# Patient Record
Sex: FEMALE | Race: WHITE | NOT HISPANIC OR LATINO | Employment: OTHER | URBAN - METROPOLITAN AREA
[De-identification: names, ages, dates, MRNs, and addresses within clinical notes are randomized per-mention and may not be internally consistent; named-entity substitution may affect disease eponyms.]

---

## 2018-03-14 ENCOUNTER — TELEPHONE (OUTPATIENT)
Dept: ORTHOPEDIC SURGERY | Facility: CLINIC | Age: 83
End: 2018-03-14

## 2018-03-19 ENCOUNTER — LAB (OUTPATIENT)
Dept: LAB | Facility: HOSPITAL | Age: 83
End: 2018-03-19

## 2018-03-19 ENCOUNTER — OFFICE VISIT (OUTPATIENT)
Dept: ORTHOPEDIC SURGERY | Facility: CLINIC | Age: 83
End: 2018-03-19

## 2018-03-19 VITALS — WEIGHT: 145 LBS | BODY MASS INDEX: 24.75 KG/M2 | HEIGHT: 64 IN | TEMPERATURE: 98.3 F

## 2018-03-19 DIAGNOSIS — S82.64XA CLOSED NONDISPLACED FRACTURE OF LATERAL MALLEOLUS OF RIGHT FIBULA, INITIAL ENCOUNTER: ICD-10-CM

## 2018-03-19 DIAGNOSIS — E55.9 VITAMIN D DEFICIENCY: ICD-10-CM

## 2018-03-19 DIAGNOSIS — S99.911A ANKLE INJURY, RIGHT, INITIAL ENCOUNTER: Primary | ICD-10-CM

## 2018-03-19 LAB — 25(OH)D3 SERPL-MCNC: 33.6 NG/ML (ref 30–100)

## 2018-03-19 PROCEDURE — 73610 X-RAY EXAM OF ANKLE: CPT | Performed by: ORTHOPAEDIC SURGERY

## 2018-03-19 PROCEDURE — 82306 VITAMIN D 25 HYDROXY: CPT

## 2018-03-19 PROCEDURE — 36415 COLL VENOUS BLD VENIPUNCTURE: CPT

## 2018-03-19 PROCEDURE — 99204 OFFICE O/P NEW MOD 45 MIN: CPT | Performed by: ORTHOPAEDIC SURGERY

## 2018-03-19 RX ORDER — INSULIN GLARGINE 100 [IU]/ML
INJECTION, SOLUTION SUBCUTANEOUS DAILY
COMMUNITY

## 2018-03-19 RX ORDER — ENTACAPONE 200 MG/1
200 TABLET ORAL 3 TIMES DAILY
COMMUNITY

## 2018-03-19 RX ORDER — ERGOCALCIFEROL (VITAMIN D2) 10 MCG
400 TABLET ORAL DAILY
COMMUNITY

## 2018-03-19 NOTE — PROGRESS NOTES
"   New Patient Complaint      Patient: Nickie Leung  YOB: 1935 82 y.o. female  Medical Record Number: [id number]    Chief Complaints: My ankle was hurting    History of Present Illness:     Patient had relatively insidious onset of ankle pain between February 10 and March 1.  She was seen at location out of town and diagnosed with a stress fracture and was placed into a cast on 3/1/18.  She's had some improvement in pain and now describes mild intermittent aching pain with swelling when she does not elevated and worse with standing and improved some with use of Tylenol.     HPI    Allergies:   Allergies   Allergen Reactions   • Epinephrine Arrhythmia     Heart speeds up   • Penicillins Rash       Medications:   No current outpatient prescriptions on file prior to visit.     No current facility-administered medications on file prior to visit.        Past Medical History:   Diagnosis Date   • Diabetes    • Parkinsons      History reviewed. No pertinent surgical history.  Social History     Occupational History   • Not on file.     Social History Main Topics   • Smoking status: Never Smoker   • Smokeless tobacco: Never Used   • Alcohol use Not on file   • Drug use: Unknown   • Sexual activity: Not on file      Social History     Social History Narrative   • No narrative on file     Family History   Problem Relation Age of Onset   • Hypertension Mother    • Diabetes Mother    • Heart disease Father        Review of Systems: 14 point review of systems performed, positive pertinent findings identified in HPI. All remaining systems negativeExcept dry eyes     Review of Systems      Physical Exam:   Vitals:    03/19/18 1032   Temp: 98.3 °F (36.8 °C)   Weight: 65.8 kg (145 lb)   Height: 162.6 cm (64\")     Physical Exam   Constitutional: pleasant, well developed  she is with her daughters  Eyes: sclera non icteric  Hearing : adequate for exam  Cardiovascular: palpable pulses in right foot, right calf/ thigh NT " without sign of DVT  Respiratoy: breathing unlabored   Neurological: grossly sensate to LT throughout right LE  Psychiatric: oriented with normal mood and affect.   Lymphatic: No palpable popliteal lymphadenopathy right LE  Skin: intact throughout right leg/foot  Musculoskeletal: Right ankle shows minimal swelling.  There is palpable callus formation over the lateral aspect of the fibula 5-6 cm proximal to the tip.  There is minimal discomfort palpation.  She had no pain at the ankle with external rotation testing and was nontender medially.  Physical Exam  Ortho Exam    Radiology: 3 views of the right ankle ordered to evaluate pain reviewed and no prior x-rays available for comparison there is a transverse fracture of the fibular shaft approximately 5-6 cm above the tip of the fibula.  There is minimal translation there does appear to be some early callus formation.  I did not see any malalignment of the talus within the mortise    Assessment/Plan: Right fibular stress fracture     I reviewed with him that given her improvement in pain as well as the alignment of it that I would not recommend surgical treatment at this time.    The cast has been bothering her quite a bit causing some superficial abrasions on the left leg.  We'll have her fitted with a boot today for use whenever she is up and about and may do some partial weightbearing with the boot with a walker.  She's keep this elevated as much as possible and he will sleep in an air stirrup brace.    He understand that should it displace or fail to heal and may require surgical treatment.    She has a history of vitamin D deficiency but only takes 400 units daily at this time.    We need to check a new level on her and may need to augment this especially while she is healing the fracture.    I will see her back in 4 weeks' x-rays of her right ankle    In the interim she may continue doing some home physical therapy which she is already doing with the therapist  for calf and quad strengthening to help maintain muscle tone.

## 2018-04-05 ENCOUNTER — TELEPHONE (OUTPATIENT)
Dept: ORTHOPEDIC SURGERY | Facility: CLINIC | Age: 83
End: 2018-04-05

## 2018-04-05 DIAGNOSIS — E55.9 VITAMIN D DEFICIENCY: Primary | ICD-10-CM

## 2018-04-05 NOTE — TELEPHONE ENCOUNTER
Call returned tot he patient's daughter.  Vitamin D level was 33.6.  Daughter reports that her mother is taking Vitamin D3 1000 units daily. Have advised her that he likes to keep Vitamin D level between 50-60.  May need to increase her dose.  Will have her continue with the same dose for now.  Will discuss with [id number] when he returns to the office.  Daughter understands and agrees. AMB

## 2018-04-06 ENCOUNTER — TELEPHONE (OUTPATIENT)
Dept: ORTHOPEDIC SURGERY | Facility: CLINIC | Age: 83
End: 2018-04-06

## 2018-04-09 NOTE — TELEPHONE ENCOUNTER
Can you please order this, please see previous office note for details of home PT and wt bearing status, thanks

## 2018-04-09 NOTE — TELEPHONE ENCOUNTER
Have left a message for patient's daughter to have her mother increase Vitamin D3 to 3000 units daily and will recheck her Vitamin D level in 4 weeks.

## 2018-04-09 NOTE — TELEPHONE ENCOUNTER
Have left a message with [facility] that it is okay to continue seeing the patient for home PT until her follow-up with Dr. Marsh on April 16 per MW M

## 2018-04-16 ENCOUNTER — OFFICE VISIT (OUTPATIENT)
Dept: ORTHOPEDIC SURGERY | Facility: CLINIC | Age: 83
End: 2018-04-16

## 2018-04-16 VITALS — TEMPERATURE: 98.1 F | BODY MASS INDEX: 24.75 KG/M2 | WEIGHT: 145 LBS | HEIGHT: 64 IN

## 2018-04-16 DIAGNOSIS — E55.9 VITAMIN D DEFICIENCY: ICD-10-CM

## 2018-04-16 DIAGNOSIS — G89.29 CHRONIC PAIN OF RIGHT ANKLE: Primary | ICD-10-CM

## 2018-04-16 DIAGNOSIS — S82.64XD CLOSED NONDISPLACED FRACTURE OF LATERAL MALLEOLUS OF RIGHT FIBULA WITH ROUTINE HEALING, SUBSEQUENT ENCOUNTER: ICD-10-CM

## 2018-04-16 DIAGNOSIS — M25.571 CHRONIC PAIN OF RIGHT ANKLE: Primary | ICD-10-CM

## 2018-04-16 PROCEDURE — 99212 OFFICE O/P EST SF 10 MIN: CPT | Performed by: ORTHOPAEDIC SURGERY

## 2018-04-16 PROCEDURE — 73610 X-RAY EXAM OF ANKLE: CPT | Performed by: ORTHOPAEDIC SURGERY

## 2018-04-16 NOTE — PROGRESS NOTES
"Ankle Follow Up      Patient: Nickie Leung    YOB: 1935 82 y.o. female    Chief Complaints: Ankle \"much better\"    History of Present Illness: Is up right ankle stress fractures that began between February 10 and March 1.  I initially saw her on 3/19/18.  Since then she's been partial weightbearing with boot and walker states the pain is much better really not having any significant pain to the right ankle.  She's been doing home physical and occupational therapy which she and her daughter feel it helped dramatically  HPI    ROS: No ankle pain  Past Medical History:   Diagnosis Date   • Diabetes    • Parkinsons        Physical Exam:   Vitals:    04/16/18 0911   Temp: 98.1 °F (36.7 °C)   TempSrc: Temporal Artery    Weight: 65.8 kg (145 lb)   Height: 162.6 cm (64\")     Well developed with normal mood.She is with her daughter.  There is palpable callus around the fracture site minimal swelling and no tenderness to palpation there is no pain with external rotation testing and nontender medially      Radiology: Views right ankle ordered to evaluate alignment reviewed and compared with previous x-rays.  His been no change in alignment to the fracture or to the ankle joint.  There is increased callus    Vitamin D 3/19/18 33.6      Assessment/Plan: 1.  Nonoperative treatment right distal fibular stress fracture  2.  Vitamin D at low end of normal    Regarding the ankle and encouraged that she is no longer having pain.  She may transition to full weightbearing with her boot and walker for the next 2 weeks and if still without pain may then transition to a cane.  Physical therapy orders were given to the daughter to continue with home therapy.    Regarding vitamin D, she is at the very low end of normal and was previous he taking 400 units daily.  Since 4/9/18 she has been increased to 3000 units daily and he understand to have her level rechecked in about 3 weeks.    I will see her back in 4 weeks' x-rays of " her right ankle.  They understand that if any point she has increase in pain she'll get off of this and let me know

## 2018-04-19 ENCOUNTER — HOSPITAL ENCOUNTER (OUTPATIENT)
Dept: CARDIOLOGY | Facility: HOSPITAL | Age: 83
Discharge: HOME | End: 2018-04-19
Attending: ORTHOPAEDIC SURGERY | Admitting: ORTHOPAEDIC SURGERY

## 2018-04-19 ENCOUNTER — HOSPITAL ENCOUNTER (OUTPATIENT)
Dept: MRI IMAGING | Facility: HOSPITAL | Age: 83
Discharge: HOME | End: 2018-04-19
Attending: ORTHOPAEDIC SURGERY

## 2018-04-19 ENCOUNTER — TELEPHONE (OUTPATIENT)
Dept: ORTHOPEDIC SURGERY | Facility: CLINIC | Age: 83
End: 2018-04-19

## 2018-04-19 ENCOUNTER — OFFICE VISIT (OUTPATIENT)
Dept: ORTHOPEDIC SURGERY | Facility: CLINIC | Age: 83
End: 2018-04-19

## 2018-04-19 VITALS — BODY MASS INDEX: 24.75 KG/M2 | WEIGHT: 145 LBS | HEIGHT: 64 IN | TEMPERATURE: 97.5 F

## 2018-04-19 DIAGNOSIS — S82.64XD CLOSED NONDISPLACED FRACTURE OF LATERAL MALLEOLUS OF RIGHT FIBULA WITH ROUTINE HEALING, SUBSEQUENT ENCOUNTER: ICD-10-CM

## 2018-04-19 DIAGNOSIS — M79.661 PAIN OF RIGHT CALF: Primary | ICD-10-CM

## 2018-04-19 DIAGNOSIS — Z87.81 HISTORY OF FRACTURE OF RIGHT ANKLE: ICD-10-CM

## 2018-04-19 DIAGNOSIS — M79.604 LEG PAIN, DIFFUSE, RIGHT: ICD-10-CM

## 2018-04-19 DIAGNOSIS — M79.661 PAIN OF RIGHT CALF: ICD-10-CM

## 2018-04-19 LAB
BH CV LOWER VASCULAR LEFT COMMON FEMORAL AUGMENT: NORMAL
BH CV LOWER VASCULAR LEFT COMMON FEMORAL COMPETENT: NORMAL
BH CV LOWER VASCULAR LEFT COMMON FEMORAL COMPRESS: NORMAL
BH CV LOWER VASCULAR LEFT COMMON FEMORAL PHASIC: NORMAL
BH CV LOWER VASCULAR LEFT COMMON FEMORAL SPONT: NORMAL
BH CV LOWER VASCULAR RIGHT COMMON FEMORAL AUGMENT: NORMAL
BH CV LOWER VASCULAR RIGHT COMMON FEMORAL COMPETENT: NORMAL
BH CV LOWER VASCULAR RIGHT COMMON FEMORAL COMPRESS: NORMAL
BH CV LOWER VASCULAR RIGHT COMMON FEMORAL PHASIC: NORMAL
BH CV LOWER VASCULAR RIGHT COMMON FEMORAL SPONT: NORMAL
BH CV LOWER VASCULAR RIGHT DISTAL FEMORAL COMPRESS: NORMAL
BH CV LOWER VASCULAR RIGHT GASTRONEMIUS COMPRESS: NORMAL
BH CV LOWER VASCULAR RIGHT GREATER SAPH AK COMPRESS: NORMAL
BH CV LOWER VASCULAR RIGHT GREATER SAPH BK COMPRESS: NORMAL
BH CV LOWER VASCULAR RIGHT MID FEMORAL AUGMENT: NORMAL
BH CV LOWER VASCULAR RIGHT MID FEMORAL COMPETENT: NORMAL
BH CV LOWER VASCULAR RIGHT MID FEMORAL COMPRESS: NORMAL
BH CV LOWER VASCULAR RIGHT MID FEMORAL PHASIC: NORMAL
BH CV LOWER VASCULAR RIGHT MID FEMORAL SPONT: NORMAL
BH CV LOWER VASCULAR RIGHT PERONEAL COMPRESS: NORMAL
BH CV LOWER VASCULAR RIGHT POPLITEAL AUGMENT: NORMAL
BH CV LOWER VASCULAR RIGHT POPLITEAL COMPETENT: NORMAL
BH CV LOWER VASCULAR RIGHT POPLITEAL COMPRESS: NORMAL
BH CV LOWER VASCULAR RIGHT POPLITEAL PHASIC: NORMAL
BH CV LOWER VASCULAR RIGHT POPLITEAL SPONT: NORMAL
BH CV LOWER VASCULAR RIGHT POSTERIOR TIBIAL COMPRESS: NORMAL
BH CV LOWER VASCULAR RIGHT PROXIMAL FEMORAL COMPRESS: NORMAL
BH CV LOWER VASCULAR RIGHT SAPHENOFEMORAL JUNCTION AUGMENT: NORMAL
BH CV LOWER VASCULAR RIGHT SAPHENOFEMORAL JUNCTION COMPETENT: NORMAL
BH CV LOWER VASCULAR RIGHT SAPHENOFEMORAL JUNCTION COMPRESS: NORMAL
BH CV LOWER VASCULAR RIGHT SAPHENOFEMORAL JUNCTION PHASIC: NORMAL
BH CV LOWER VASCULAR RIGHT SAPHENOFEMORAL JUNCTION SPONT: NORMAL

## 2018-04-19 PROCEDURE — 99213 OFFICE O/P EST LOW 20 MIN: CPT | Performed by: ORTHOPAEDIC SURGERY

## 2018-04-19 PROCEDURE — 73590 X-RAY EXAM OF LOWER LEG: CPT | Performed by: ORTHOPAEDIC SURGERY

## 2018-04-19 PROCEDURE — 73718 MRI LOWER EXTREMITY W/O DYE: CPT

## 2018-04-19 PROCEDURE — 73610 X-RAY EXAM OF ANKLE: CPT | Performed by: ORTHOPAEDIC SURGERY

## 2018-04-19 PROCEDURE — 93971 EXTREMITY STUDY: CPT

## 2018-04-19 NOTE — PROGRESS NOTES
"Ankle Follow Up      Patient: Nickie Leung    YOB: 1935 82 y.o. female    Chief Complaints: leg hurts    History of Present Illness:  followed for right ankle stress fracture of the lateral malleolus.  She was last seen on 4/16/18 and had been partial weightbearing with her boot.  At that point I allowed her to increase weightbearing and patient's daughter called this morning assessment laceration is at increased pain with weightbearing along the posterior aspect of the right leg with pain along the calf.  She was brought in today for further evaluation.  She describes new onset moderate throbbing aching pain along the posterior aspect more so than anterior of the right midportion of the leg with weightbearing not having pain at the ankle itself  HPI    ROS: No ankle pain, positive for leg pain, no chest pain or shortness of breath  Past Medical History:   Diagnosis Date   • Diabetes    • Parkinsons        Physical Exam:   Vitals:    04/19/18 0947   Temp: 97.5 °F (36.4 °C)   TempSrc: Temporal Artery    Weight: 65.8 kg (145 lb)   Height: 162.6 cm (64\")     Well developed with normal mood.She is with her daughter.  She was nontender over the fibula there is palpable callus and really no focal tenderness over the distal tibia.  On standing she had moderate discomfort along the midportion of the calf on the musculotendinous junction with the Achilles.  And mild discomfort to the midportion of the tibia anteriorly.  Right calf measured 34.5 cm left measured 36 cm      Radiology: 2 views of the right tib-fib and 3 views the right ankle ordered to evaluate pain reviewed and compared with previous x-rays.  I don't see any change in alignment to the fibula.  The does appear to be a questionable area of sclerosis along the distal portion of the tibia that I do not see any obvious fracture through the midportion of the tibia      Assessment/Plan:  1.  Right distal fibular stress fracture  2.  Vitamin D low end " of normal  3.  New-onset right calf and somewhat right anterior tibial pain.    Discussed with patient and her daughter that we'll keep her partial weightbearing for now with boot and walker and hold on physical therapy.  We'll going to get a Doppler to make shows no blood clot and also get an MRI of her right tibia-fibula make sure there is no additional stress fractures.    I will see her back in one week with x-ray of her right ankle and tib/fib

## 2018-04-20 ENCOUNTER — TELEPHONE (OUTPATIENT)
Dept: ORTHOPEDIC SURGERY | Facility: CLINIC | Age: 83
End: 2018-04-20

## 2018-04-20 NOTE — TELEPHONE ENCOUNTER
I spoke with patient's daughter and really the MRI findings of the distal and proximal tibial stress fractures in addition to the fibula fractures that we already knew about.  I counseled her that I did not see anything to do from a surgical standpoint at this time to have her mother continue with protected weightbearing only with the boot and a walker.    She is also now taking 3000 units of vitamin D daily rather than the 1000 units that the daughter reports she was taking previously.  I will see her as scheduled with x-rays of her right tib-fib and ankle

## 2018-04-23 ENCOUNTER — TELEPHONE (OUTPATIENT)
Dept: ORTHOPEDIC SURGERY | Facility: CLINIC | Age: 83
End: 2018-04-23

## 2018-04-23 NOTE — TELEPHONE ENCOUNTER
I spoke with home health nurse and reviewed MRI results.  We'll have patient continue with 50% weightbearing as pain allows with boot and walker with assistance and continue with quad hamstring and lower leg strengthening exercises to maintain muscle tone.

## 2018-05-11 PROBLEM — S82.309D: Status: ACTIVE | Noted: 2018-05-11

## 2018-05-11 PROBLEM — S82.101D CLOSED FRACTURE OF UPPER END OF RIGHT TIBIA WITH ROUTINE HEALING: Status: ACTIVE | Noted: 2018-05-11

## 2018-05-11 NOTE — PROGRESS NOTES
"Ankle Follow Up      Patient: Nickie Leung    YOB: 1935 82 y.o. female    Chief Complaints: Ankle \"feels good\"    History of Present Illness: Patient was followed for right ankle stress fracture of the lateral malleolus.  She was last seen on 4/19/18 and several days prior to that had been improved and allowed to increase weightbearing.  With increased weightbearing she had increased pain along the distal aspect of the right leg and along the calf.  At that time she was sent for MRI and found to have an incomplete stress fracture along the distal 5 cm of the tibia as well as abnormal marrow change in the proximal tibia felt to be an insufficiency fracture as well.  I spoke with her daughter at length about this on the phone and have allowed her to do only 50% weightbearing with her boot and walker.  After last visit she was also sent for a Doppler on 4/19/18 which was negative for DVT.    She and her daughter states overall her pain is much better and essentially resolved.  She's very frustrated by her inability to put the boot on herself due to her Parkinson's.  States that she really has no pain to the ankle or leg at all anymore.  HPI    ROS: No ankle pain  Past Medical History:   Diagnosis Date   • Diabetes    • Parkinsons        Physical Exam:   There were no vitals filed for this visit.  Well developed with normal mood.He is with her daughter.  She was nontender completely of the right distal fibula or tibia no pain in the calf pain over the proximal tibia.      Radiology:MRI films and report of the right lower leg from 4/19/18 were reviewed which show distal fibular diametaphysis subacute stress fracture as well as incomplete insufficiency fracture over the distal tibial metaphysis and proximal tibia.    3 views of the right ankle and 2 views the right tib-fib ordered to evaluate alignment reviewed and compared with previous x-rays.  I do not see any change in alignment does appear to be " callus formation still around the fibula fracture no obvious change in alignment to the distal or proximal tibia    Vitamin D 3/19/18 33.6      Assessment/Plan:  1.  Right distal fibular stress fracture  2.  Right distal tibia stress fracture  3.  Right proximal tibia stress fracture  4.  Vitamin D low end normal.  Regarding her vitamin D she is on 3000 units daily but has not yet had her level rechecked.    Overall she seems be doing very well.  Discussed treatment with her and gave her orders for home health and for home physical therapy that she may start gradually weaning out of the boot but continues and walker basically she'll start by trying to use this to go to the bathroom without her boot and if she is without pain she may then gradually wean out of the boot around the house and if he remains without pain may start gradually weaning out of the boot out of the house but will always continue use of her walker.    We discussed use of an ASO brace but she doesn't think she could manage this given her Parkinson's.    regarding her vitamin D she is to go have her level rechecked may need to adjust dosages.    If at any point she has increased pain or swelling she'll get off of this and let me know otherwise I'll see her back in 1 month x-rays of her right ankle and tib-fib

## 2018-05-14 ENCOUNTER — LAB (OUTPATIENT)
Dept: LAB | Facility: HOSPITAL | Age: 83
End: 2018-05-14
Attending: ORTHOPAEDIC SURGERY

## 2018-05-14 ENCOUNTER — OFFICE VISIT (OUTPATIENT)
Dept: ORTHOPEDIC SURGERY | Facility: CLINIC | Age: 83
End: 2018-05-14

## 2018-05-14 VITALS — TEMPERATURE: 98.3 F | WEIGHT: 140 LBS | HEIGHT: 64 IN | BODY MASS INDEX: 23.9 KG/M2

## 2018-05-14 DIAGNOSIS — E55.9 VITAMIN D DEFICIENCY: ICD-10-CM

## 2018-05-14 DIAGNOSIS — S82.301D CLOSED EXTRA-ARTICULAR FRACTURE OF DISTAL END OF RIGHT TIBIA WITH ROUTINE HEALING, SUBSEQUENT ENCOUNTER: Primary | ICD-10-CM

## 2018-05-14 DIAGNOSIS — S82.101D CLOSED FRACTURE OF PROXIMAL END OF RIGHT TIBIA WITH ROUTINE HEALING, UNSPECIFIED FRACTURE MORPHOLOGY, SUBSEQUENT ENCOUNTER: ICD-10-CM

## 2018-05-14 LAB — 25(OH)D3 SERPL-MCNC: 39.2 NG/ML (ref 30–100)

## 2018-05-14 PROCEDURE — 99213 OFFICE O/P EST LOW 20 MIN: CPT | Performed by: ORTHOPAEDIC SURGERY

## 2018-05-14 PROCEDURE — 27530 TREAT KNEE FRACTURE: CPT | Performed by: ORTHOPAEDIC SURGERY

## 2018-05-14 PROCEDURE — 36415 COLL VENOUS BLD VENIPUNCTURE: CPT

## 2018-05-14 PROCEDURE — 73590 X-RAY EXAM OF LOWER LEG: CPT | Performed by: ORTHOPAEDIC SURGERY

## 2018-05-14 PROCEDURE — 27824 TREAT LOWER LEG FRACTURE: CPT | Performed by: ORTHOPAEDIC SURGERY

## 2018-05-14 PROCEDURE — 73610 X-RAY EXAM OF ANKLE: CPT | Performed by: ORTHOPAEDIC SURGERY

## 2018-05-14 PROCEDURE — 82306 VITAMIN D 25 HYDROXY: CPT

## 2018-05-18 ENCOUNTER — TELEPHONE (OUTPATIENT)
Dept: ORTHOPEDIC SURGERY | Facility: CLINIC | Age: 83
End: 2018-05-18

## 2018-05-18 DIAGNOSIS — E55.9 VITAMIN D DEFICIENCY: Primary | ICD-10-CM

## 2018-05-18 NOTE — TELEPHONE ENCOUNTER
Have spoken with the patient's daughter.  Her serum vitamin D level was 39.2.  She is presently on thousand units of vitamin D 3 times a day for total of 3000 units.  Have instructed her that Dr. Marsh is recommending that she increase her serum vitamin D level to 5000 units every day and will recheck her serum vitamin D level in about 6 weeks per Dr. Marsh

## 2018-05-18 NOTE — TELEPHONE ENCOUNTER
----- Message from Jose Marsh MD sent at 5/14/2018  4:42 PM EDT -----  Please have her increase to D3 5k daily and recheck in 6 weeks, thanks

## 2018-05-23 ENCOUNTER — TELEPHONE (OUTPATIENT)
Dept: ORTHOPEDIC SURGERY | Facility: CLINIC | Age: 83
End: 2018-05-23

## 2018-05-23 NOTE — TELEPHONE ENCOUNTER
I returned phone call the patient's therapist Marimar Carter clarified orders that she is to continue use of her walker and may weight-bear as tolerated gradually weaning out of her boot as her pain allows and to include strengthening range of motion and gait training etc.  If she has any increase in pain to get off and let me know

## 2018-06-11 ENCOUNTER — TELEPHONE (OUTPATIENT)
Dept: ORTHOPEDIC SURGERY | Facility: CLINIC | Age: 83
End: 2018-06-11

## 2018-06-11 ENCOUNTER — LAB (OUTPATIENT)
Dept: LAB | Facility: HOSPITAL | Age: 83
End: 2018-06-11

## 2018-06-11 ENCOUNTER — OFFICE VISIT (OUTPATIENT)
Dept: ORTHOPEDIC SURGERY | Facility: CLINIC | Age: 83
End: 2018-06-11

## 2018-06-11 VITALS — WEIGHT: 140 LBS | HEIGHT: 64 IN | BODY MASS INDEX: 23.9 KG/M2

## 2018-06-11 DIAGNOSIS — S82.301D CLOSED EXTRA-ARTICULAR FRACTURE OF DISTAL END OF RIGHT TIBIA WITH ROUTINE HEALING, SUBSEQUENT ENCOUNTER: ICD-10-CM

## 2018-06-11 DIAGNOSIS — Z86.39 H/O VITAMIN D DEFICIENCY: ICD-10-CM

## 2018-06-11 DIAGNOSIS — S82.101D CLOSED FRACTURE OF PROXIMAL END OF RIGHT TIBIA WITH ROUTINE HEALING, UNSPECIFIED FRACTURE MORPHOLOGY, SUBSEQUENT ENCOUNTER: ICD-10-CM

## 2018-06-11 DIAGNOSIS — M25.571 RIGHT ANKLE PAIN, UNSPECIFIED CHRONICITY: Primary | ICD-10-CM

## 2018-06-11 DIAGNOSIS — E55.9 VITAMIN D DEFICIENCY: ICD-10-CM

## 2018-06-11 DIAGNOSIS — T14.90XA INJURY: ICD-10-CM

## 2018-06-11 DIAGNOSIS — E55.9 VITAMIN D DEFICIENCY: Primary | ICD-10-CM

## 2018-06-11 DIAGNOSIS — S82.64XD CLOSED NONDISPLACED FRACTURE OF LATERAL MALLEOLUS OF RIGHT FIBULA WITH ROUTINE HEALING, SUBSEQUENT ENCOUNTER: ICD-10-CM

## 2018-06-11 LAB — 25(OH)D3 SERPL-MCNC: 46.1 NG/ML (ref 30–100)

## 2018-06-11 PROCEDURE — 36415 COLL VENOUS BLD VENIPUNCTURE: CPT

## 2018-06-11 PROCEDURE — 82306 VITAMIN D 25 HYDROXY: CPT

## 2018-06-11 PROCEDURE — 73610 X-RAY EXAM OF ANKLE: CPT | Performed by: ORTHOPAEDIC SURGERY

## 2018-06-11 PROCEDURE — 73590 X-RAY EXAM OF LOWER LEG: CPT | Performed by: ORTHOPAEDIC SURGERY

## 2018-06-11 PROCEDURE — 99212 OFFICE O/P EST SF 10 MIN: CPT | Performed by: ORTHOPAEDIC SURGERY

## 2018-06-11 NOTE — TELEPHONE ENCOUNTER
M vitamin D level was 46.1.  Have spoken with the patient and informed her to continue her vitamin D 3 at 5000 units daily and will recheck her serum vitamin D level in 4 weeks per Dr. Marsh

## 2018-06-11 NOTE — PROGRESS NOTES
"Ankle Follow Up      Patient: Nickie Leung    YOB: 1935 83 y.o. female    Chief Complaints: Ankle and leg are feeling better    History of Present Illness: Patient was initially seen on 3/19/18 with right distal fibula stress fracture.  She was subsequently diagnosed on 5-14 with right distal tibia and proximal tibia stress fractures.  Since then she has increased weightbearing in her boot and gotten around occasionally without it and neck she feels better when she is out of the boot than in it.  Gets some mild achy pain with first weight in the morning but improves with ambulation.  She continues to use her walker.  She has been doing PT and OT with improvements with those but treatment was somewhat delayed due to an episode of shingles  HPI    ROS: ankle pain  Past Medical History:   Diagnosis Date   • Diabetes    • Parkinsons    • UTI (urinary tract infection)        Physical Exam:   Vitals:    06/11/18 0953   Weight: 63.5 kg (140 lb)   Height: 162.6 cm (64\")     Well developed with normal mood.With her daughter.  Really no focal pain to palpation over the proximal or distal tibia or over the lateral fibula there is palpable callus formation and no pain with ankle range of motion      Radiology: 2 views of the right tib-fib and 3 views the right ankle ordered today to evaluate alignment and reviewed and compared to previous x-rays.  There is increased callus formation around the fibula without change in alignment.  No obvious malalignment to the proximal or distal tibia or fractures appear to be healing in good alignment    Vitamin D 5/14/18 39.2      Assessment/Plan:  .  Right distal fibular stress fracture  2.  Right distal tibia stress fracture  3.  Right proximal tibia stress fracture  4.  Vitamin D low end normal.      It's 5/18/18 she's been on 5000 units of vitamin D daily and have her level rechecked this morning.  We'll call her with those results.    Regarding her right proximal and " distal tibia as well as fibula fractures are don't see anything to do from an operative standpoint.  As long as her pain is improving she may start weaning out of her boot but continue use of walker and OT and PT.    Follow-up in 4 weeks' x-rays right ankle and tib-fib

## 2018-07-09 ENCOUNTER — OFFICE VISIT (OUTPATIENT)
Dept: ORTHOPEDIC SURGERY | Facility: CLINIC | Age: 83
End: 2018-07-09

## 2018-07-09 ENCOUNTER — LAB (OUTPATIENT)
Dept: LAB | Facility: HOSPITAL | Age: 83
End: 2018-07-09

## 2018-07-09 VITALS — BODY MASS INDEX: 23.9 KG/M2 | TEMPERATURE: 98.3 F | WEIGHT: 140 LBS | HEIGHT: 64 IN

## 2018-07-09 DIAGNOSIS — E55.9 VITAMIN D DEFICIENCY: ICD-10-CM

## 2018-07-09 DIAGNOSIS — Z09 FRACTURE FOLLOW-UP: Primary | ICD-10-CM

## 2018-07-09 DIAGNOSIS — S82.301D CLOSED EXTRA-ARTICULAR FRACTURE OF DISTAL END OF RIGHT TIBIA WITH ROUTINE HEALING, SUBSEQUENT ENCOUNTER: ICD-10-CM

## 2018-07-09 DIAGNOSIS — S82.64XD CLOSED NONDISPLACED FRACTURE OF LATERAL MALLEOLUS OF RIGHT FIBULA WITH ROUTINE HEALING, SUBSEQUENT ENCOUNTER: ICD-10-CM

## 2018-07-09 DIAGNOSIS — S82.101D CLOSED FRACTURE OF PROXIMAL END OF RIGHT TIBIA WITH ROUTINE HEALING, UNSPECIFIED FRACTURE MORPHOLOGY, SUBSEQUENT ENCOUNTER: ICD-10-CM

## 2018-07-09 LAB — 25(OH)D3 SERPL-MCNC: 50.7 NG/ML (ref 30–100)

## 2018-07-09 PROCEDURE — 36415 COLL VENOUS BLD VENIPUNCTURE: CPT

## 2018-07-09 PROCEDURE — 73610 X-RAY EXAM OF ANKLE: CPT | Performed by: ORTHOPAEDIC SURGERY

## 2018-07-09 PROCEDURE — 73590 X-RAY EXAM OF LOWER LEG: CPT | Performed by: ORTHOPAEDIC SURGERY

## 2018-07-09 PROCEDURE — 99024 POSTOP FOLLOW-UP VISIT: CPT | Performed by: ORTHOPAEDIC SURGERY

## 2018-07-09 PROCEDURE — 82306 VITAMIN D 25 HYDROXY: CPT

## 2018-07-09 NOTE — PROGRESS NOTES
"Ankle Follow Up      Patient: Nickie Leung    YOB: 1935 83 y.o. female    Chief Complaints: Ankle and leg feel much better    History of Present Illness: Patient is seen back today for right distal fibula stress fracture initially seen on 3/19/18 and was subsequent diagnosed on 5/14/18 with distal tibia and fibula stress fractures.  She was last seen on 6/11/18 at which time she was improving with instructions to wean out of her boot and continue with therapy.    She states now that she's been out of her boot for about 3 weeks and still uses a walker occasionally out of the house.  And states that it is feeling \"very good\".  Her daughter says she basically \"beat walks\" around the house.  HPI    ROS: No ankle or knee pain  Past Medical History:   Diagnosis Date   • Diabetes (CMS/[facility])    • Parkinsons (CMS/[facility])    • UTI (urinary tract infection)        Physical Exam:   Vitals:    07/09/18 0952   Temp: 98.3 °F (36.8 °C)   Weight: 63.5 kg (140 lb)   Height: 162.6 cm (64\")     Well developed with normal mood.She is with her daughter today she was completely nontender about the proximal or distal tibia or fibula.  She had no pain with range of motion to the ankle.      Radiology: 3 views the right ankle and 2 views the right tib-fib ordered today to evaluate fractures reviewed and compared with previous x-rays.  Proximal and distal tibia and distal tibial fractures appear well-healed without significant malalignment.    Vitamin D 6/11/18  46.1      Assessment/Plan:  .  Right distal fibular stress fracture  2.  Right distal tibia stress fracture  3.  Right proximal tibia stress fracture  4.  Vitamin D low end normal.  She's been on 5000 units per week and was last checked around 6/11/18.  She will have her level rechecked today and she and her daughter understand that she may need some dosage adjustment and then will need to follow-up with her primary care physician within the next several weeks for further " monitoring and dosing most likely    Overall she is markedly improved and I recommended that she continue with use of her walker out of the house or at least a cane.  She'll continue with occupational and physical therapy work on strengthening and gait training but overall she's doing much better.  We had a pleasant visit anything worsens she'll get back into her boot and let me know otherwise I will see her back as needed

## 2018-07-10 ENCOUNTER — TELEPHONE (OUTPATIENT)
Dept: ORTHOPEDIC SURGERY | Facility: CLINIC | Age: 83
End: 2018-07-10

## 2018-07-10 DIAGNOSIS — E55.9 VITAMIN D DEFICIENCY: Primary | ICD-10-CM

## 2018-07-10 NOTE — TELEPHONE ENCOUNTER
----- Message from Jose Marsh MD sent at 7/9/2018 12:41 PM EDT -----  Please have her drop to D3 4K units daily and recheck in 4 week either here or with her PCP thanks

## 2018-07-10 NOTE — TELEPHONE ENCOUNTER
Serum vitamin D level was 50.7.  Dr. Marsh is recommending that she decrease her vitamin D to 4000 units daily.  I spoke with the family member who states that she has about a week's worth of the 5000 units and would like to finish that first before starting the 4000 units.  She'll will need to have a repeat serum vitamin D level drawn in 4 weeks.  Orders have been placed in [facility] however I had did give her the option of going to her PCP for her repeat vitamin D level if she so desires per Dr. Marsh

## 2018-08-28 NOTE — TELEPHONE ENCOUNTER
----- Message from Jose Marsh MD sent at 6/11/2018 10:24 AM EDT -----  Please have her cont with 5K of D3 daily and recheck in 4 weeks   Done